# Patient Record
Sex: MALE | Race: WHITE | HISPANIC OR LATINO | Employment: OTHER | URBAN - METROPOLITAN AREA
[De-identification: names, ages, dates, MRNs, and addresses within clinical notes are randomized per-mention and may not be internally consistent; named-entity substitution may affect disease eponyms.]

---

## 2018-01-16 NOTE — RESULT NOTES
Verified Results  (1) COMPREHENSIVE METABOLIC PANEL [id number] 87:31AS [email or web address]     Test Name Result Flag Reference   GLUCOSE 102 mg/dL H 65-99   Fasting reference interval   UREA NITROGEN (BUN) 16 mg/dL  7-25   CREATININE 0 78 mg/dL  0 60-1 35   eGFR NON-AFR   AMERICAN 111 mL/min/1 73m2  > OR = 60   eGFR AFRICAN AMERICAN 129 mL/min/1 73m2  > OR = 60   BUN/CREATININE RATIO   2-02   NOT APPLICABLE (calc)   SODIUM 137 mmol/L  135-146   POTASSIUM 4 5 mmol/L  3 5-5 3   CHLORIDE 101 mmol/L     CARBON DIOXIDE 27 mmol/L  19-30   CALCIUM 9 6 mg/dL  8 6-10 3   PROTEIN, TOTAL 7 6 g/dL  6 1-8 1   ALBUMIN 4 3 g/dL  3 6-5 1   GLOBULIN 3 3 g/dL (calc)  1 9-3 7   ALBUMIN/GLOBULIN RATIO 1 3 (calc)  1 0-2 5   BILIRUBIN, TOTAL 0 4 mg/dL  0 2-1 2   ALKALINE PHOSPHATASE 58 U/L     AST 23 U/L  10-40   ALT 49 U/L H 9-46     (1) CBC/PLT/DIFF [id number] 08:10AM [email or web address]     Test Name Result Flag Reference   WHITE BLOOD CELL COUNT 7 6 Thousand/uL  3 8-10 8   RED BLOOD CELL COUNT 5 64 Million/uL  4 20-5 80   HEMOGLOBIN 15 1 g/dL  13 2-17 1   HEMATOCRIT 48 5 %  38 5-50 0   MCV 86 1 fL  80 0-100 0   MCH 26 9 pg L 27 0-33 0   MCHC 31 2 g/dL L 32 0-36 0   RDW 14 1 %  11 0-15 0   PLATELET COUNT 589 Thousand/uL  140-400   MPV 8 9 fL  7 5-11 5   ABSOLUTE NEUTROPHILS 4332 cells/uL  4529-2476   ABSOLUTE LYMPHOCYTES 2090 cells/uL  850-3900   ABSOLUTE MONOCYTES 844 cells/uL  200-950   ABSOLUTE EOSINOPHILS 289 cells/uL     ABSOLUTE BASOPHILS 46 cells/uL  0-200   NEUTROPHILS 57 0 %     LYMPHOCYTES 27 5 %     MONOCYTES 11 1 %     EOSINOPHILS 3 8 %     BASOPHILS 0 6 %       (1) LIPID PANEL, FASTING [id number] 08:10AM [email or web address]     Test Name Result Flag Reference   CHOLESTEROL, TOTAL 181 mg/dL  125-200   HDL CHOLESTEROL 47 mg/dL  > OR = 40   TRIGLICERIDES 63 mg/dL  <475   LDL-CHOLESTEROL 121 mg/dL (calc)  <130   Desirable range <100 mg/dL for patients with CHD or  diabetes and <70 mg/dL for diabetic patients with  known heart disease  CHOL/HDLC RATIO 3 9 (calc)  < OR = 5 0   NON HDL CHOLESTEROL 134 mg/dL (calc)     Target for non-HDL cholesterol is 30 mg/dL higher than   LDL cholesterol target  (Q) MICROALBUMIN, RANDOM URINE (W/CREATININE) [id number] 08:10AM [facility]     Test Name Result Flag Reference   CREATININE, RANDOM URINE 98 mg/dL     MICROALBUMIN 0 4 mg/dL     Reference Range  Not established   MICROALBUMIN/CREATININE$RATIO, RANDOM URINE 4 mcg/mg creat  <30   The ADA defines abnormalities in albumin  excretion as follows:     Category         Result (mcg/mg creatinine)     Normal                    <30  Microalbuminuria            Clinical albuminuria   > OR = 300     The ADA recommends that at least two of three  specimens collected within a 3-6 month period be  abnormal before considering a patient to be  within a diagnostic category  (Q) TSH, 3RD GENERATION [id number] 08:10AM [facility]     Test Name Result Flag Reference   TSH 1 10 mIU/L  0 40-4 50     (Q) HEMOGLOBIN A1c [id number] 08:10AM [facility]   REPORT COMMENT:  2ND LAB XQC#[id number]  FASTING:YES     Test Name Result Flag Reference   HEMOGLOBIN A1c 5 9 % of total Hgb H <5 7   According to ADA guidelines, hemoglobin A1c <7 0%  represents optimal control in non-pregnant diabetic  patients  Different metrics may apply to specific  patient populations  Standards of Medical Care in  [phone]  Diabetes Care  2013;36:s11-s66     For the purpose of screening for the presence of  diabetes  <5 7%       Consistent with the absence of diabetes  5 7-6 4%    Consistent with increased risk for diabetes              (prediabetes)  >or=6 5%    Consistent with diabetes     This assay result is consistent with an increased risk  of diabetes  Currently, no consensus exists for use of hemoglobin  A1c for diagnosis of diabetes for children  Discussion/Summary   sugars are surprisingly good  Cholesterol is well controled    normal blod count , kidneys, liver thyroid    Keep scheduled follow up

## 2018-03-22 ENCOUNTER — HOSPITAL ENCOUNTER (EMERGENCY)
Facility: HOSPITAL | Age: 43
Discharge: HOME | End: 2018-03-23
Attending: EMERGENCY MEDICINE
Payer: MEDICARE

## 2018-03-22 VITALS
WEIGHT: 289 LBS | HEART RATE: 78 BPM | TEMPERATURE: 99 F | DIASTOLIC BLOOD PRESSURE: 82 MMHG | OXYGEN SATURATION: 97 % | RESPIRATION RATE: 16 BRPM | SYSTOLIC BLOOD PRESSURE: 144 MMHG | BODY MASS INDEX: 42.68 KG/M2

## 2018-03-22 DIAGNOSIS — R68.89 FLU-LIKE SYMPTOMS: Primary | ICD-10-CM

## 2018-03-23 ENCOUNTER — APPOINTMENT (EMERGENCY)
Dept: RADIOLOGY | Facility: HOSPITAL | Age: 43
End: 2018-03-23
Payer: MEDICARE

## 2018-03-23 LAB — S PYO AG THROAT QL: NEGATIVE

## 2018-03-23 PROCEDURE — 71046 X-RAY EXAM CHEST 2 VIEWS: CPT

## 2018-03-23 PROCEDURE — 87070 CULTURE OTHR SPECIMN AEROBIC: CPT | Performed by: PHYSICIAN ASSISTANT

## 2018-03-23 PROCEDURE — 87430 STREP A AG IA: CPT | Performed by: PHYSICIAN ASSISTANT

## 2018-03-23 PROCEDURE — 99283 EMERGENCY DEPT VISIT LOW MDM: CPT

## 2018-03-23 NOTE — DISCHARGE INSTRUCTIONS
Influenza   WHAT YOU NEED TO KNOW:   Influenza (the flu) is an infection caused by the influenza virus  The flu is easily spread when an infected person coughs, sneezes, or has close contact with others  You may be able to spread the flu to others for 1 week or longer after signs or symptoms appear  DISCHARGE INSTRUCTIONS:   Call 911 for any of the following:   · You have trouble breathing, and your lips look purple or blue  · You have a seizure  Return to the emergency department if:   · You are dizzy, or you are urinating less or not at all  · You have a headache with a stiff neck, and you feel tired or confused  · You have new pain or pressure in your chest     · Your symptoms, such as shortness of breath, vomiting, or diarrhea, get worse  · Your symptoms, such as fever and coughing, seem to get better, but then get worse  Contact your healthcare provider if:   · You have new muscle pain or weakness  · You have questions or concerns about your condition or care  Medicines: You may need any of the following:  · Acetaminophen  decreases pain and fever  It is available without a doctor's order  Ask how much to take and how often to take it  Follow directions  Acetaminophen can cause liver damage if not taken correctly  · NSAIDs , such as ibuprofen, help decrease swelling, pain, and fever  This medicine is available with or without a doctor's order  NSAIDs can cause stomach bleeding or kidney problems in certain people  If you take blood thinner medicine, always ask your healthcare provider if NSAIDs are safe for you  Always read the medicine label and follow directions  · Antivirals  help fight a viral infection  · Take your medicine as directed  Contact your healthcare provider if you think your medicine is not helping or if you have side effects  Tell him or her if you are allergic to any medicine  Keep a list of the medicines, vitamins, and herbs you take   Include the amounts, and when and why you take them  Bring the list or the pill bottles to follow-up visits  Carry your medicine list with you in case of an emergency  Rest  as much as you can to help you recover  Drink liquids as directed  to help prevent dehydration  Ask how much liquid to drink each day and which liquids are best for you  Prevent the spread of influenza:   · Wash your hands often  Use soap and water  Wash your hands after you use the bathroom, change a child's diapers, or sneeze  Wash your hands before you prepare or eat food  Use gel hand cleanser when soap and water are not available  Do not touch your eyes, nose, or mouth unless you have washed your hands first            · Cover your mouth when you sneeze or cough  Cough into a tissue or the bend of your arm  · Clean shared items with a germ-killing   Clean table surfaces, doorknobs, and light switches  Do not share towels, silverware, and dishes with people who are sick  Wash bed sheets, towels, silverware, and dishes with soap and water  · Wear a mask  over your mouth and nose if you are sick or are near anyone who is sick  · Stay away from others  if you are sick  · Influenza vaccine  helps prevent influenza (flu)  Everyone older than 6 months should get a yearly influenza vaccine  Get the vaccine as soon as it is available, usually in September or October each year  Follow up with your healthcare provider as directed:  Write down your questions so you remember to ask them during your visits  © 2017 [address] Information is for End User's use only and may not be sold, redistributed or otherwise used for commercial purposes  All illustrations and images included in CareNotes® are the copyrighted property of [employer] , [employer]  or [employer]  The above information is an  only  It is not intended as medical advice for individual conditions or treatments   Talk to your doctor, nurse or pharmacist before following any medical regimen to see if it is safe and effective for you  DISCHARGE INSTRUCTIONS:    FOLLOW UP WITH YOUR PRIMARY CARE PROVIDER OR THE [address] [place] [address]  MAKE AN APPOINTMENT TO BE SEEN  TAKE TYLENOL OR MOTRIN FOR PAIN OR FEVER  REST AND DRINK PLENTY OF FLUIDS  IF SYMPTOMS WORSEN OR NEW SYMPTOMS ARISE, RETURN TO THE ER TO BE SEEN

## 2018-03-23 NOTE — ED PROVIDER NOTES
History  Chief Complaint   Patient presents with    Generalized Body Aches     pt c/o generalized body aches for the past 24 hrs  pt denies any fevers; pt denies any n/v/d      42y  o male with PMH of ADHD and asthma presents to the ER for sore throat, subjective fever and myalgias for 1 day  Patient has been using Listerine gargles for sore throat without relief  He describes his pain as irritated  He states his sore throat pain radiates to his left ear  Symptoms are constant  He denies sick contacts or recent travel  Associated symptoms: cough  He denies chills, rhinorrhea, congestion, chest pain, dyspnea, N/V/D, abdominal pain, weakness or paresthesias  History provided by:  Patient   used: No        None       Past Medical History:   Diagnosis Date    ADHD     Asthma        Past Surgical History:   Procedure Laterality Date    LEG AMPUTATION Left 2008    SHOULDER SURGERY Right 2008       History reviewed  No pertinent family history  I have reviewed and agree with the history as documented  Social History   Substance Use Topics    Smoking status: Never Smoker    Smokeless tobacco: Never Used    Alcohol use Yes      Comment: socially        Review of Systems   Constitutional: Positive for fever (subjective)  Negative for activity change, appetite change and chills  HENT: Positive for sore throat  Negative for congestion, drooling, ear discharge, ear pain, facial swelling and rhinorrhea  Eyes: Negative for redness  Respiratory: Positive for cough  Negative for shortness of breath  Cardiovascular: Negative for chest pain  Gastrointestinal: Negative for abdominal pain, diarrhea, nausea and vomiting  Musculoskeletal: Negative for neck stiffness  Skin: Negative for rash  Allergic/Immunologic: Negative for food allergies  Neurological: Negative for weakness and numbness         Physical Exam  ED Triage Vitals [03/22/18 2325]   Temperature Pulse Respirations Blood Pressure SpO2   99 °F (37 2 °C) 78 16 144/82 97 %      Temp Source Heart Rate Source Patient Position - Orthostatic VS BP Location FiO2 (%)   Oral Monitor Sitting Right arm --      Pain Score       6           Orthostatic Vital Signs  Vitals:    03/22/18 2325   BP: 144/82   Pulse: 78   Patient Position - Orthostatic VS: Sitting       Physical Exam   Constitutional:  Non-toxic appearance  No distress  HENT:   Head: Normocephalic and atraumatic  Right Ear: Tympanic membrane, external ear and ear canal normal  No drainage, swelling or tenderness  No foreign bodies  Tympanic membrane is not erythematous  No hemotympanum  Left Ear: Tympanic membrane, external ear and ear canal normal  No drainage, swelling or tenderness  No foreign bodies  Tympanic membrane is not erythematous  No hemotympanum  Nose: Nose normal    Mouth/Throat: Uvula is midline and mucous membranes are normal  No uvula swelling  Posterior oropharyngeal erythema present  No posterior oropharyngeal edema or tonsillar abscesses  No tonsillar exudate  Eyes: Conjunctivae and lids are normal    Neck: Normal range of motion and phonation normal  Neck supple  No tracheal deviation present  Cardiovascular: Normal rate, regular rhythm, S1 normal, S2 normal and normal heart sounds  Exam reveals no gallop and no friction rub  No murmur heard  Pulmonary/Chest: Effort normal and breath sounds normal  No respiratory distress  He has no decreased breath sounds  He has no wheezes  He has no rhonchi  He has no rales  He exhibits no tenderness  Neurological: He is alert  GCS eye subscore is 4  GCS verbal subscore is 5  GCS motor subscore is 6  Skin: Skin is warm and dry  No rash noted  Psychiatric: He has a normal mood and affect  Nursing note and vitals reviewed        ED Medications  Medications - No data to display    Diagnostic Studies  Results Reviewed     Procedure Component Value Units Date/Time    Rapid Beta strep screen [[id number]] (Normal) Collected:  03/23/18 0018    Lab Status:  Final result Specimen:  Throat from Throat Updated:  03/23/18 0035     Rapid Strep A Screen Negative    Throat culture [[id number]] Collected:  03/23/18 0018    Lab Status: In process Specimen:  Throat from Throat Updated:  03/23/18 0035                 XR chest 2 views   ED Interpretation by Max Lopez PA-C (03/23 5395)   No acute consolidation seen by me at this time  Procedures  Procedures       Phone Contacts  ED Phone Contact    ED Course  ED Course                                MDM  Number of Diagnoses or Management Options  Flu-like symptoms: new and requires workup  Diagnosis management comments: DDX consists of but not limited to: viral syndrome, strep, abscess, mono, flu, pneumonia, bronchitis    Will check strep and CXR  Informed patient I did not see any acute consolidation on CXR at this time and if the radiologist saw anything concerning when reading the xray, we would call to inform him  Patient agreeable  At discharge, I instructed the patient to:  -follow up with pcp  -take Tylenol or Motrin for pain or fever  -rest and drink plenty of fluids  -return to the ER if symptoms worsened or new symptoms arose  Patient agreed to this plan and was stable at time of discharge         Amount and/or Complexity of Data Reviewed  Clinical lab tests: ordered and reviewed  Tests in the radiology section of CPT®: ordered and reviewed  Independent visualization of images, tracings, or specimens: yes    Patient Progress  Patient progress: stable    CritCare Time    Disposition  Final diagnoses:   Flu-like symptoms     Time reflects when diagnosis was documented in both MDM as applicable and the Disposition within this note     Time User Action Codes Description Comment    3/23/2018 12:52 AM Jade ROBB Add [R61 89] Flu-like symptoms       ED Disposition     ED Disposition Condition Comment    Discharge  [facility] discharge to home/self care  Condition at discharge: Stable        Follow-up Information     Follow up With Specialties Details Why Natalia Araujo MD [facility] Schedule an appointment as soon as possible for a visit in 1 day  [address]  [phone]          Patient's Medications    No medications on file     No discharge procedures on file      ED Provider  Electronically Signed by           Syeda Walker PA-C  03/23/18 7662

## 2018-03-25 LAB — BACTERIA THROAT CULT: NORMAL

## 2018-11-01 ENCOUNTER — OFFICE VISIT (OUTPATIENT)
Dept: FAMILY MEDICINE CLINIC | Facility: CLINIC | Age: 43
End: 2018-11-01
Payer: MEDICARE

## 2018-11-01 VITALS
DIASTOLIC BLOOD PRESSURE: 84 MMHG | SYSTOLIC BLOOD PRESSURE: 132 MMHG | WEIGHT: 315 LBS | HEIGHT: 69 IN | BODY MASS INDEX: 46.65 KG/M2

## 2018-11-01 DIAGNOSIS — E11.9 TYPE 2 DIABETES MELLITUS WITHOUT COMPLICATION, WITHOUT LONG-TERM CURRENT USE OF INSULIN (HCC): ICD-10-CM

## 2018-11-01 DIAGNOSIS — J45.20 MILD INTERMITTENT ASTHMA WITHOUT COMPLICATION: ICD-10-CM

## 2018-11-01 DIAGNOSIS — Z23 NEED FOR VACCINATION: ICD-10-CM

## 2018-11-01 DIAGNOSIS — K76.0 NONALCOHOLIC FATTY LIVER DISEASE: ICD-10-CM

## 2018-11-01 DIAGNOSIS — E66.01 MORBID OBESITY WITH BMI OF 45.0-49.9, ADULT (HCC): ICD-10-CM

## 2018-11-01 DIAGNOSIS — H04.302 TEAR DUCT INFECTION, LEFT: Primary | ICD-10-CM

## 2018-11-01 DIAGNOSIS — E78.2 MIXED HYPERLIPIDEMIA: ICD-10-CM

## 2018-11-01 PROBLEM — Z89.612 HX OF AKA (ABOVE KNEE AMPUTATION), LEFT (HCC): Status: ACTIVE | Noted: 2018-11-01

## 2018-11-01 LAB
ALBUMIN SERPL BCP-MCNC: 4 G/DL (ref 3.5–5)
ALP SERPL-CCNC: 81 U/L (ref 46–116)
ALT SERPL W P-5'-P-CCNC: 92 U/L (ref 12–78)
ANION GAP SERPL CALCULATED.3IONS-SCNC: 7 MMOL/L (ref 4–13)
AST SERPL W P-5'-P-CCNC: 51 U/L (ref 5–45)
BACTERIA UR QL AUTO: ABNORMAL /HPF
BASOPHILS # BLD AUTO: 0.04 THOUSANDS/ΜL (ref 0–0.1)
BASOPHILS NFR BLD AUTO: 1 % (ref 0–1)
BILIRUB SERPL-MCNC: 0.44 MG/DL (ref 0.2–1)
BILIRUB UR QL STRIP: NEGATIVE
BUN SERPL-MCNC: 12 MG/DL (ref 5–25)
CALCIUM SERPL-MCNC: 9 MG/DL (ref 8.3–10.1)
CHLORIDE SERPL-SCNC: 103 MMOL/L (ref 100–108)
CHOLEST SERPL-MCNC: 203 MG/DL (ref 50–200)
CLARITY UR: CLEAR
CO2 SERPL-SCNC: 27 MMOL/L (ref 21–32)
COLOR UR: ABNORMAL
CREAT SERPL-MCNC: 0.75 MG/DL (ref 0.6–1.3)
EOSINOPHIL # BLD AUTO: 0.17 THOUSAND/ΜL (ref 0–0.61)
EOSINOPHIL NFR BLD AUTO: 3 % (ref 0–6)
ERYTHROCYTE [DISTWIDTH] IN BLOOD BY AUTOMATED COUNT: 13.7 % (ref 11.6–15.1)
EST. AVERAGE GLUCOSE BLD GHB EST-MCNC: 169 MG/DL
GFR SERPL CREATININE-BSD FRML MDRD: 111 ML/MIN/1.73SQ M
GLUCOSE P FAST SERPL-MCNC: 89 MG/DL (ref 65–99)
GLUCOSE UR STRIP-MCNC: NEGATIVE MG/DL
HBA1C MFR BLD: 7.5 % (ref 4.2–6.3)
HCT VFR BLD AUTO: 48.8 % (ref 36.5–49.3)
HDLC SERPL-MCNC: 49 MG/DL (ref 40–60)
HGB BLD-MCNC: 15.4 G/DL (ref 12–17)
HGB UR QL STRIP.AUTO: NEGATIVE
HYALINE CASTS #/AREA URNS LPF: ABNORMAL /LPF
IMM GRANULOCYTES # BLD AUTO: 0.02 THOUSAND/UL (ref 0–0.2)
IMM GRANULOCYTES NFR BLD AUTO: 0 % (ref 0–2)
KETONES UR STRIP-MCNC: NEGATIVE MG/DL
LDLC SERPL CALC-MCNC: 136 MG/DL (ref 0–100)
LEUKOCYTE ESTERASE UR QL STRIP: NEGATIVE
LYMPHOCYTES # BLD AUTO: 2.17 THOUSANDS/ΜL (ref 0.6–4.47)
LYMPHOCYTES NFR BLD AUTO: 41 % (ref 14–44)
MCH RBC QN AUTO: 27.3 PG (ref 26.8–34.3)
MCHC RBC AUTO-ENTMCNC: 31.6 G/DL (ref 31.4–37.4)
MCV RBC AUTO: 86 FL (ref 82–98)
MONOCYTES # BLD AUTO: 0.65 THOUSAND/ΜL (ref 0.17–1.22)
MONOCYTES NFR BLD AUTO: 12 % (ref 4–12)
NEUTROPHILS # BLD AUTO: 2.26 THOUSANDS/ΜL (ref 1.85–7.62)
NEUTS SEG NFR BLD AUTO: 43 % (ref 43–75)
NITRITE UR QL STRIP: NEGATIVE
NON-SQ EPI CELLS URNS QL MICRO: ABNORMAL /HPF
NONHDLC SERPL-MCNC: 154 MG/DL
NRBC BLD AUTO-RTO: 0 /100 WBCS
PH UR STRIP.AUTO: 5.5 [PH] (ref 4.5–8)
PLATELET # BLD AUTO: 237 THOUSANDS/UL (ref 149–390)
PMV BLD AUTO: 11 FL (ref 8.9–12.7)
POTASSIUM SERPL-SCNC: 4.8 MMOL/L (ref 3.5–5.3)
PROT SERPL-MCNC: 7.9 G/DL (ref 6.4–8.2)
PROT UR STRIP-MCNC: ABNORMAL MG/DL
RBC # BLD AUTO: 5.65 MILLION/UL (ref 3.88–5.62)
RBC #/AREA URNS AUTO: ABNORMAL /HPF
SODIUM SERPL-SCNC: 137 MMOL/L (ref 136–145)
SP GR UR STRIP.AUTO: 1.03 (ref 1–1.03)
TRIGL SERPL-MCNC: 90 MG/DL
TSH SERPL DL<=0.05 MIU/L-ACNC: 0.96 UIU/ML (ref 0.36–3.74)
UROBILINOGEN UR QL STRIP.AUTO: 0.2 E.U./DL
WBC # BLD AUTO: 5.31 THOUSAND/UL (ref 4.31–10.16)
WBC #/AREA URNS AUTO: ABNORMAL /HPF

## 2018-11-01 PROCEDURE — 83036 HEMOGLOBIN GLYCOSYLATED A1C: CPT | Performed by: FAMILY MEDICINE

## 2018-11-01 PROCEDURE — 36415 COLL VENOUS BLD VENIPUNCTURE: CPT | Performed by: FAMILY MEDICINE

## 2018-11-01 PROCEDURE — 80053 COMPREHEN METABOLIC PANEL: CPT | Performed by: FAMILY MEDICINE

## 2018-11-01 PROCEDURE — 84443 ASSAY THYROID STIM HORMONE: CPT | Performed by: FAMILY MEDICINE

## 2018-11-01 PROCEDURE — 81001 URINALYSIS AUTO W/SCOPE: CPT | Performed by: FAMILY MEDICINE

## 2018-11-01 PROCEDURE — 85025 COMPLETE CBC W/AUTO DIFF WBC: CPT | Performed by: FAMILY MEDICINE

## 2018-11-01 PROCEDURE — 99214 OFFICE O/P EST MOD 30 MIN: CPT | Performed by: FAMILY MEDICINE

## 2018-11-01 PROCEDURE — 80061 LIPID PANEL: CPT | Performed by: FAMILY MEDICINE

## 2018-11-01 PROCEDURE — 90682 RIV4 VACC RECOMBINANT DNA IM: CPT

## 2018-11-01 PROCEDURE — 90471 IMMUNIZATION ADMIN: CPT

## 2018-11-01 RX ORDER — OFLOXACIN 3 MG/ML
2 SOLUTION/ DROPS OPHTHALMIC 4 TIMES DAILY
Qty: 5 ML | Refills: 0 | Status: SHIPPED | OUTPATIENT
Start: 2018-11-01 | End: 2018-12-21

## 2018-11-01 NOTE — ASSESSMENT & PLAN NOTE
Had been diet controlled in the past   With his significant weight gain   I am not so sure his numbers will be as good  Will get lab today, staff will contact him once we get the results    Diet and exercise discussed at length

## 2018-11-01 NOTE — ASSESSMENT & PLAN NOTE
Diet and exercise discussed at length  Needs to do 30 minutes of cardio 5 days a week, needs to get his diet in line with his other medical conditions  Patient promises he will do so, knows what to do

## 2018-11-01 NOTE — PROGRESS NOTES
Assessment/Plan:    Type 2 diabetes mellitus without complication, without long-term current use of insulin ([facility] [address] )  Has been diet controlled in the past   Lab will be drawn the office today, staff will contact him once we get the results  Mild intermittent asthma without complication  Has not used an inhaler in years  Will get a flu shot today  Mixed hyperlipidemia  Had been diet controlled in the past   With his significant weight gain   I am not so sure his numbers will be as good  Will get lab today, staff will contact him once we get the results  Diet and exercise discussed at length    Nonalcoholic fatty liver disease  Lab drawn in the office today  Staff will contact him once we have the results  Morbid obesity with BMI of 45 0-49 9, adult ([facility])  Diet and exercise discussed at length  Needs to do 30 minutes of cardio 5 days a week, needs to get his diet in line with his other medical conditions  Patient promises he will do so, knows what to do      Hx of AKA (above knee amputation), left ([facility] [address] )  Needs to follow up with the prostatic specialist        Diagnoses and all orders for this visit:    Tear duct infection, left  -     ofloxacin (OCUFLOX) 0 3 % ophthalmic solution; Administer 2 drops into the left eye 4 (four) times a day    Type 2 diabetes mellitus without complication, without long-term current use of insulin ([facility])  -     CBC and differential  -     Comprehensive metabolic panel  -     Hemoglobin A1C  -     Lipid panel  -     TSH, 3rd generation  -     Urinalysis with reflex to microscopic    Nonalcoholic fatty liver disease  -     CBC and differential  -     Comprehensive metabolic panel    Mixed hyperlipidemia  -     Lipid panel  -     TSH, 3rd generation    Mild intermittent asthma without complication    Need for vaccination  -     influenza vaccine, [id number], quadrivalent, recombinant, PF, 0 5 mL, for patients 18-49 yr with comorbidities ([facility])    Morbid obesity with BMI of 45 0-49 9, adult [facility])          Subjective:   Chief Complaint   Patient presents with    Eye Drainage     left x 1 month          Patient ID: Aayush Oneill is a 37 y o  male  Patient is a 67-year-old male presenting to the office complaining of left eye drainage intermittently for the last several months  States he will wake up in the morning and is eyelids will be glued shut  During the day, he notices drainage from the inner aspect of his left eye  He has been over 2 years since his last checkup, he is way overdue for lab work and a physical exam   Fortunately, he is doing well  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 [employer]  He is taking classes to his master's degree in some type of  system  The following portions of the patient's history were reviewed and updated as appropriate: allergies, current medications, past family history, past medical history, past social history, past surgical history and problem list     Review of Systems   Constitutional: Positive for unexpected weight change (60 lb over the last 2 years)  Negative for appetite change, chills, diaphoresis, fatigue and fever  HENT: Negative for congestion, ear pain, hearing loss, nosebleeds, postnasal drip, rhinorrhea, sinus pressure, sore throat, tinnitus and trouble swallowing  Eyes: Positive for discharge  Negative for photophobia, pain, redness, itching and visual disturbance  Respiratory: Negative for cough, chest tightness, shortness of breath and wheezing  Cardiovascular: Negative for chest pain, palpitations and leg swelling  Denies orthopnea , dyspnea on exertion   Gastrointestinal: Negative for abdominal distention, abdominal pain, blood in stool, constipation, diarrhea, nausea and vomiting  Endocrine: Negative  Genitourinary: Negative for difficulty urinating, dysuria, flank pain, frequency, hematuria and urgency          Denies nocturia , erectile dysfunction   Musculoskeletal: Negative for arthralgias, back pain, gait problem, joint swelling and myalgias  Skin: Negative for pallor, rash and wound  Denies skin lesions   Allergic/Immunologic: Negative for environmental allergies, food allergies and immunocompromised state  Neurological: Negative for dizziness, tremors, seizures, syncope, speech difficulty, weakness, numbness and headaches  Hematological: Negative for adenopathy  Does not bruise/bleed easily  Psychiatric/Behavioral: Negative for behavioral problems, confusion, sleep disturbance and suicidal ideas  The patient is not nervous/anxious  Objective:      /84   Ht 5' 9" (1 753 m)   Wt (!) 149 kg (328 lb)   BMI 48 44 kg/m²          Physical Exam   Constitutional: He is oriented to person, place, and time  He appears well-developed and well-nourished  Pleasant, morbidly obese  HENT:   Head: Normocephalic and atraumatic  Nose: Nose normal    Mouth/Throat: Oropharynx is clear and moist    Eyes: Pupils are equal, round, and reactive to light  Conjunctivae and EOM are normal  Left eye exhibits discharge  Neck: Normal range of motion  Neck supple  No JVD present  No tracheal deviation present  No thyromegaly present  Cardiovascular: Normal rate, regular rhythm and intact distal pulses  No murmur heard  Pulmonary/Chest: Effort normal and breath sounds normal  He has no wheezes  He has no rales  Abdominal: Soft  Bowel sounds are normal  He exhibits no mass  There is no tenderness  There is no rebound and no guarding  Musculoskeletal: He exhibits no edema, tenderness or deformity  Left lower extremity prosthesis  Lymphadenopathy:     He has no cervical adenopathy  Neurological: He is alert and oriented to person, place, and time  He has normal reflexes  No cranial nerve deficit  He exhibits normal muscle tone  Coordination normal    Skin: Skin is warm and dry  No lesion and no rash noted   Nails show no clubbing  Psychiatric: He has a normal mood and affect  Judgment normal    He has endure a lot of stress  His son is a member of again, has committed multiple crimes  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

## 2018-11-01 NOTE — ASSESSMENT & PLAN NOTE
Has been diet controlled in the past   Lab will be drawn the office today, staff will contact him once we get the results

## 2018-11-14 ENCOUNTER — OFFICE VISIT (OUTPATIENT)
Dept: FAMILY MEDICINE CLINIC | Facility: CLINIC | Age: 43
End: 2018-11-14
Payer: MEDICARE

## 2018-11-14 VITALS
SYSTOLIC BLOOD PRESSURE: 124 MMHG | WEIGHT: 315 LBS | BODY MASS INDEX: 46.65 KG/M2 | DIASTOLIC BLOOD PRESSURE: 82 MMHG | HEIGHT: 69 IN

## 2018-11-14 DIAGNOSIS — E11.9 TYPE 2 DIABETES MELLITUS WITHOUT COMPLICATION, WITHOUT LONG-TERM CURRENT USE OF INSULIN (HCC): Primary | ICD-10-CM

## 2018-11-14 DIAGNOSIS — J45.20 MILD INTERMITTENT ASTHMA WITHOUT COMPLICATION: ICD-10-CM

## 2018-11-14 DIAGNOSIS — K76.0 NONALCOHOLIC FATTY LIVER DISEASE: ICD-10-CM

## 2018-11-14 PROCEDURE — 99214 OFFICE O/P EST MOD 30 MIN: CPT | Performed by: FAMILY MEDICINE

## 2018-11-14 RX ORDER — LANCETS 28 GAUGE
EACH MISCELLANEOUS
Qty: 100 EACH | Refills: 2 | Status: SHIPPED | OUTPATIENT
Start: 2018-11-14

## 2018-11-14 RX ORDER — ALBUTEROL SULFATE 90 UG/1
2 AEROSOL, METERED RESPIRATORY (INHALATION) EVERY 6 HOURS PRN
Qty: 1 INHALER | Refills: 5 | Status: SHIPPED | OUTPATIENT
Start: 2018-11-14 | End: 2018-12-16

## 2018-11-14 NOTE — PROGRESS NOTES
Assessment/Plan:    Type 2 diabetes mellitus without complication, without long-term current use of insulin ([facility])   HB A1c is 7 5  Patient was given a glucometer, he will check his sugars on a daily basis fasting and 2 hr postprandial, but only once a day  He is to alternate times  He will call though sugars into the office in 2 weeks  If his sugars are consistently 1 less than 160 will not have to start medication  Goal is for all sugars to be less than 160, fastings to be closer to 120  Regardless, we will recheck an HB A1c in 3 months  He has a 2000 K calorie ADA diet and will follow that  I spent more than 25 min with the patient today and more than half of that time was spent counseling and coordinating care  Diagnoses and all orders for this visit:    Type 2 diabetes mellitus without complication, without long-term current use of insulin ([facility])  -     Glucometer test strips  -     Lancets (FREESTYLE) lancets; Use as instructed    Nonalcoholic fatty liver disease    Mild intermittent asthma without complication  -     albuterol (VENTOLIN HFA) 90 mcg/act inhaler; Inhale 2 puffs every 6 (six) hours as needed for wheezing          Subjective:   Chief Complaint   Patient presents with    Follow-up     discuss blood work          Patient ID: Tyesha Jose is a 37 y o  male  Patient is a 57-year-old male presenting to the office for follow-up on his recent lab work  His HB A1c is elevated at 7 5 so technically he is now diabetic  He did have trace protein in his urine  Liver enzymes are mildly elevated, consistent with his history of fatty liver disease  He has already started to diet, he has lost 2 lb since his last visit  He has an eye exam next week          The following portions of the patient's history were reviewed and updated as appropriate: allergies, current medications, past family history, past medical history, past social history, past surgical history and problem list     Review of Systems   Constitutional: Negative for activity change, appetite change, fatigue and unexpected weight change  HENT: Negative  Eyes: Negative  Respiratory: Positive for chest tightness and wheezing  Had a flare of his asthma last week, had to stop at his sister's house to borrow her inhaler  Requesting a refill on his  Has not needed to use an inhaler in over a year and half  Cardiovascular: Negative  Gastrointestinal: Negative  Endocrine: Positive for polydipsia, polyphagia and polyuria  Negative for cold intolerance and heat intolerance  Genitourinary: Positive for frequency  Nocturia x2   Musculoskeletal: Negative  Skin: Negative  Allergic/Immunologic: Negative for immunocompromised state  Neurological: Negative for dizziness, weakness and light-headedness  Hematological: Negative  Psychiatric/Behavioral: Negative  Objective:      /82   Ht 5' 9" (1 753 m)   Wt (!) 148 kg (326 lb)   BMI 48 14 kg/m²          Physical Exam   Constitutional: He is oriented to person, place, and time  He appears well-developed and well-nourished  HENT:   Head: Normocephalic and atraumatic  Nose: Nose normal    Mouth/Throat: Oropharynx is clear and moist    Eyes: Pupils are equal, round, and reactive to light  Conjunctivae and EOM are normal    Neck: Normal range of motion  Neck supple  No thyromegaly present  Cardiovascular: Normal rate, regular rhythm and intact distal pulses  No murmur heard  Pulmonary/Chest: Effort normal and breath sounds normal  He has no wheezes  He has no rales  Abdominal: Soft  Bowel sounds are normal  There is no tenderness  Musculoskeletal: He exhibits no edema  Status post left AKA   Neurological: He is alert and oriented to person, place, and time  He has normal reflexes  He exhibits normal muscle tone  Coordination normal    Skin: Skin is warm and dry  No lesion and no rash noted  Nails show no clubbing  Psychiatric: He has a normal mood and affect  Judgment normal    Nursing note and vitals reviewed

## 2018-11-14 NOTE — ASSESSMENT & PLAN NOTE
HB A1c is 7 5  Patient was given a glucometer, he will check his sugars on a daily basis fasting and 2 hr postprandial, but only once a day  He is to alternate times  He will call though sugars into the office in 2 weeks  If his sugars are consistently 1 less than 160 will not have to start medication  Goal is for all sugars to be less than 160, fastings to be closer to 120  Regardless, we will recheck an HB A1c in 3 months  He has a 2000 K calorie ADA diet and will follow that  I spent more than 25 min with the patient today and more than half of that time was spent counseling and coordinating care

## 2018-11-26 ENCOUNTER — TELEPHONE (OUTPATIENT)
Dept: FAMILY MEDICINE CLINIC | Facility: CLINIC | Age: 43
End: 2018-11-26

## 2018-12-03 ENCOUNTER — TELEPHONE (OUTPATIENT)
Dept: FAMILY MEDICINE CLINIC | Facility: CLINIC | Age: 43
End: 2018-12-03

## 2018-12-03 NOTE — TELEPHONE ENCOUNTER
Continue current medications for now  Continue checking sugars at least once a day  Recheck Hb A1c 3 months from last visit

## 2018-12-16 ENCOUNTER — OFFICE VISIT (OUTPATIENT)
Dept: URGENT CARE | Facility: MEDICAL CENTER | Age: 43
End: 2018-12-16
Payer: MEDICARE

## 2018-12-16 VITALS
TEMPERATURE: 97.7 F | HEART RATE: 66 BPM | BODY MASS INDEX: 45.18 KG/M2 | HEIGHT: 69 IN | RESPIRATION RATE: 20 BRPM | DIASTOLIC BLOOD PRESSURE: 84 MMHG | SYSTOLIC BLOOD PRESSURE: 136 MMHG | WEIGHT: 305 LBS | OXYGEN SATURATION: 96 %

## 2018-12-16 DIAGNOSIS — J02.9 SORE THROAT: ICD-10-CM

## 2018-12-16 DIAGNOSIS — J45.901 EXACERBATION OF ASTHMA, UNSPECIFIED ASTHMA SEVERITY, UNSPECIFIED WHETHER PERSISTENT: Primary | ICD-10-CM

## 2018-12-16 LAB — S PYO AG THROAT QL: NEGATIVE

## 2018-12-16 PROCEDURE — 99213 OFFICE O/P EST LOW 20 MIN: CPT | Performed by: FAMILY MEDICINE

## 2018-12-16 PROCEDURE — G0463 HOSPITAL OUTPT CLINIC VISIT: HCPCS | Performed by: FAMILY MEDICINE

## 2018-12-16 RX ORDER — BENZONATATE 100 MG/1
100 CAPSULE ORAL 3 TIMES DAILY PRN
Qty: 20 CAPSULE | Refills: 0 | Status: SHIPPED | OUTPATIENT
Start: 2018-12-16

## 2018-12-16 RX ORDER — ALBUTEROL SULFATE 90 UG/1
2 AEROSOL, METERED RESPIRATORY (INHALATION) EVERY 6 HOURS PRN
Qty: 1 INHALER | Refills: 0 | Status: SHIPPED | OUTPATIENT
Start: 2018-12-16

## 2018-12-16 NOTE — PATIENT INSTRUCTIONS
I prescribed Ventolin inhaler, Tessalon Perles 100 mg q 8 hours  Recommended patient obtain Mucinex for expectoration  Also recommended Flonase nasal spray for nasal congestion  Follow-up PCP symptoms worsen  Asthma   WHAT YOU NEED TO KNOW:   Asthma is a lung disease that makes breathing difficult  Chronic inflammation and reactions to triggers narrow the airways in the lungs  Asthma can become life-threatening if it is not managed  DISCHARGE INSTRUCTIONS:   Return to the emergency department if:   · You have severe shortness of breath  · Your lips or nails turn blue or gray  · The skin around your neck and ribs pulls in with each breath  · You have shortness of breath, even after you take your short-term medicine as directed  · Your peak flow numbers are in the red zone of your AAP  Contact your healthcare provider if:   · You run out of medicine before your next refill is due  · Your symptoms get worse  · You need to take more medicine than usual to control your symptoms  · You have questions or concerns about your condition or care  Medicines:   · Medicines  decrease inflammation, open airways, and make it easier to breathe  Medicines may be inhaled, taken as a pill, or injected  Short-term medicines relieve your symptoms quickly  Long-term medicines are used to prevent future attacks  You may also need medicine to help control your allergies  Ask your healthcare provider for more information about the medicine you are given and how to take it safely  · Take your medicine as directed  Contact your healthcare provider if you think your medicine is not helping or if you have side effects  Tell him of her if you are allergic to any medicine  Keep a list of the medicines, vitamins, and herbs you take  Include the amounts, and when and why you take them  Bring the list or the pill bottles to follow-up visits  Carry your medicine list with you in case of an emergency    Follow up with your healthcare provider as directed: You will need to return to make sure your medicine is working and your symptoms are controlled  You may be referred to an asthma specialist  Alyssa Tommyrodrick may be asked to keep a record of your peak flow values and bring it with you to your appointments  Write down your questions so you remember to ask them during your visits  Manage your symptoms and prevent future attacks:   · Follow your Asthma Action Plan (AAP)  This is a written plan that you and your healthcare provider create  It explains which medicine you need and when to change doses if necessary  It also explains how you can monitor symptoms and use a peak flow meter  The meter measures how well your lungs are working  · Manage other health conditions , such as allergies, acid reflux, and sleep apnea  · Identify and avoid triggers  These may include pets, dust mites, mold, and cockroaches  · Do not smoke or be around others who smoke  Nicotine and other chemicals in cigarettes and cigars can cause lung damage  Ask your healthcare provider for information if you currently smoke and need help to quit  E-cigarettes or smokeless tobacco still contain nicotine  Talk to your healthcare provider before you use these products  · Ask about the flu vaccine  The flu can make your asthma worse  You may need a yearly flu shot  © 2017 [employer] Information is for End User's use only and may not be sold, redistributed or otherwise used for commercial purposes  All illustrations and images included in CareNotes® are the copyrighted property of [employer]  or [employer]  The above information is an  only  It is not intended as medical advice for individual conditions or treatments  Talk to your doctor, nurse or pharmacist before following any medical regimen to see if it is safe and effective for you

## 2018-12-16 NOTE — PROGRESS NOTES
[facility]        NAME: Toney Marques is a 37 y o  male  : 1975    MRN: [id number]  DATE: 2018  TIME: 10:14 AM    Assessment and Plan   Exacerbation of asthma, unspecified asthma severity, unspecified whether persistent [J45 901]  1  Exacerbation of asthma, unspecified asthma severity, unspecified whether persistent  albuterol (PROVENTIL HFA,VENTOLIN HFA) 90 mcg/act inhaler    benzonatate (TESSALON PERLES) 100 mg capsule   2  Sore throat  POCT rapid strepA         Patient Instructions       Follow up with PCP in 3-5 days  Proceed to  ER if symptoms worsen  Chief Complaint     Chief Complaint   Patient presents with    Sore Throat     Patient complains of sor throat, cough and congestion since last Thursday         History of Present Illness       Patient complaining of sore throat for last 4 days  It began as scratchy in nature now burning  He has used some Sucrets with mild improvement  Also describes 2 day history of cough accompanied by postnasal drip  Currently taking no cough medication  His cough is predominant nonproductive it is accompanied by shortness of breath  Does describe a history of asthma but has not used her rescue inhaler since he ran out of at while ago  Review of Systems   Review of Systems   HENT: Positive for congestion and sore throat  Respiratory: Positive for cough and shortness of breath            Current Medications       Current Outpatient Prescriptions:     albuterol (PROVENTIL HFA,VENTOLIN HFA) 90 mcg/act inhaler, Inhale 2 puffs every 6 (six) hours as needed for wheezing, Disp: 1 Inhaler, Rfl: 0    benzonatate (TESSALON PERLES) 100 mg capsule, Take 1 capsule (100 mg total) by mouth 3 (three) times a day as needed for cough, Disp: 20 capsule, Rfl: 0    Lancets (FREESTYLE) lancets, Use as instructed, Disp: 100 each, Rfl: 2    ofloxacin (OCUFLOX) 0 3 % ophthalmic solution, Administer 2 drops into the left eye 4 (four) times a day (Patient not taking: Reported on 12/16/2018 ), Disp: 5 mL, Rfl: 0    Current Allergies     Allergies as of 12/16/2018 - Reviewed 12/16/2018   Allergen Reaction Noted    Lorazepam Syncope 03/22/2018            The following portions of the patient's history were reviewed and updated as appropriate: allergies, current medications, past family history, past medical history, past social history, past surgical history and problem list      Past Medical History:   Diagnosis Date    ADHD     Asthma     Depression     Last assessed 2/22/2013     Pneumonia     Community acquired  Resolved 5/27/2014     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 ([address] )     Resolved 6/2/2014        Past Surgical History:   Procedure Laterality Date    COLONOSCOPY      LEG AMPUTATION Left 2008    SHOULDER SURGERY Right 2008       Family History   Problem Relation Age of Onset    Anemia Mother     Diabetes Father     Colon cancer Father     Multiple sclerosis Sister     Alzheimer's disease Maternal Grandmother     Diabetes Maternal Grandmother     Alzheimer's disease Maternal Grandfather     Diabetes Paternal Grandmother     Throat cancer Paternal Grandmother     Diabetes Paternal Grandfather          Medications have been verified  Objective   /84 (BP Location: Left arm, Patient Position: Sitting)   Pulse 66   Temp 97 7 °F (36 5 °C) (Tympanic)   Resp 20   Ht 5' 9" (1 753 m)   Wt (!) 138 kg (305 lb)   SpO2 96%   BMI 45 04 kg/m²        Physical Exam     Physical Exam   HENT:   Right Ear: External ear normal    Left Ear: External ear normal    Mouth/Throat: Oropharynx is clear and moist    Neck: Normal range of motion  Neck supple  Pulmonary/Chest: Effort normal  He has wheezes  Nursing note and vitals reviewed

## 2018-12-18 ENCOUNTER — OFFICE VISIT (OUTPATIENT)
Dept: URGENT CARE | Facility: MEDICAL CENTER | Age: 43
End: 2018-12-18
Payer: MEDICARE

## 2018-12-18 VITALS
RESPIRATION RATE: 20 BRPM | WEIGHT: 304 LBS | BODY MASS INDEX: 45.03 KG/M2 | HEIGHT: 69 IN | TEMPERATURE: 100 F | HEART RATE: 77 BPM | SYSTOLIC BLOOD PRESSURE: 125 MMHG | DIASTOLIC BLOOD PRESSURE: 60 MMHG | OXYGEN SATURATION: 95 %

## 2018-12-18 DIAGNOSIS — J45.901 ASTHMA WITH ACUTE EXACERBATION, UNSPECIFIED ASTHMA SEVERITY, UNSPECIFIED WHETHER PERSISTENT: ICD-10-CM

## 2018-12-18 DIAGNOSIS — J20.8 ACUTE BACTERIAL BRONCHITIS: Primary | ICD-10-CM

## 2018-12-18 DIAGNOSIS — B96.89 ACUTE BACTERIAL BRONCHITIS: Primary | ICD-10-CM

## 2018-12-18 PROCEDURE — 99214 OFFICE O/P EST MOD 30 MIN: CPT | Performed by: FAMILY MEDICINE

## 2018-12-18 PROCEDURE — G0463 HOSPITAL OUTPT CLINIC VISIT: HCPCS | Performed by: FAMILY MEDICINE

## 2018-12-18 RX ORDER — AZITHROMYCIN 250 MG/1
TABLET, FILM COATED ORAL
Qty: 6 TABLET | Refills: 0 | Status: SHIPPED | OUTPATIENT
Start: 2018-12-18 | End: 2018-12-22

## 2018-12-18 RX ORDER — PREDNISONE 10 MG/1
10 TABLET ORAL DAILY
Qty: 32 TABLET | Refills: 0 | Status: SHIPPED | OUTPATIENT
Start: 2018-12-18

## 2018-12-19 NOTE — PROGRESS NOTES
[facility]        NAME: Puja oMsley is a 37 y o  male  : 1975    MRN: [id number]  DATE: 2018  TIME: 10:34 PM    Assessment and Plan   Acute bacterial bronchitis [J20 8, B96 89]  1  Acute bacterial bronchitis  azithromycin (ZITHROMAX) 250 mg tablet    predniSONE 10 mg tablet   2  Asthma with acute exacerbation, unspecified asthma severity, unspecified whether persistent         Patient Instructions       Follow up with PCP in 3-5 days  Proceed to  ER if symptoms worsen  Chief Complaint     Chief Complaint   Patient presents with    Cough     Cough x 5 days  Evaluated here by Dr Blossom Kayser  Tx mucinex, thaisslocamelia perrles, and inhaler  Pt's condition is worsening and now he has fever of 102 0  History of Present Illness       76-year-old male with history of asthma comes in complaining of cough and wheezing for the past week  He has been taking over-the-counter medicine without much relief  Cough and shortness of breath or increasing  He also has fevers now  He denies any nausea vomiting or diarrhea          Review of Systems   Review of Systems  As above    Current Medications       Current Outpatient Prescriptions:     albuterol (PROVENTIL HFA,VENTOLIN HFA) 90 mcg/act inhaler, Inhale 2 puffs every 6 (six) hours as needed for wheezing, Disp: 1 Inhaler, Rfl: 0    azithromycin (ZITHROMAX) 250 mg tablet, Take 2 tablets today then 1 tablet daily x 4 days, Disp: 6 tablet, Rfl: 0    benzonatate (TESSALON PERLES) 100 mg capsule, Take 1 capsule (100 mg total) by mouth 3 (three) times a day as needed for cough, Disp: 20 capsule, Rfl: 0    Lancets (FREESTYLE) lancets, Use as instructed, Disp: 100 each, Rfl: 2    ofloxacin (OCUFLOX) 0 3 % ophthalmic solution, Administer 2 drops into the left eye 4 (four) times a day (Patient not taking: Reported on 2018 ), Disp: 5 mL, Rfl: 0    predniSONE 10 mg tablet, Take 1 tablet (10 mg total) by mouth daily, Disp: 32 tablet, Rfl: 0    Current Allergies     Allergies as of 12/18/2018 - Reviewed 12/18/2018   Allergen Reaction Noted    Lorazepam Syncope 03/22/2018            The following portions of the patient's history were reviewed and updated as appropriate: allergies, current medications, past family history, past medical history, past social history, past surgical history and problem list      Past Medical History:   Diagnosis Date    ADHD     Asthma     Depression     Last assessed 2/22/2013     Pneumonia     Community acquired  Resolved 5/27/2014     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 ([address] )     Resolved 6/2/2014        Past Surgical History:   Procedure Laterality Date    COLONOSCOPY      LEG AMPUTATION Left 2008    SHOULDER SURGERY Right 2008       Family History   Problem Relation Age of Onset    Anemia Mother     Diabetes Father     Colon cancer Father     Multiple sclerosis Sister     Alzheimer's disease Maternal Grandmother     Diabetes Maternal Grandmother     Alzheimer's disease Maternal Grandfather     Diabetes Paternal Grandmother     Throat cancer Paternal Grandmother     Diabetes Paternal Grandfather          Medications have been verified  Objective   /60   Pulse 77   Temp 100 °F (37 8 °C)   Resp 20   Ht 5' 9" (1 753 m)   Wt (!) 138 kg (304 lb)   SpO2 95%   BMI 44 89 kg/m²        Physical Exam     Physical Exam   Constitutional: He is oriented to person, place, and time  He appears well-developed and well-nourished  HENT:   Head: Normocephalic and atraumatic  Mouth/Throat: Oropharynx is clear and moist    Eyes: Conjunctivae are normal    Neck: Neck supple  Cardiovascular: Normal rate, regular rhythm and normal heart sounds  Pulmonary/Chest: Effort normal  No respiratory distress  He has wheezes (Wheezing in all fields)  Abdominal: Soft  Bowel sounds are normal  He exhibits no distension  Musculoskeletal: Normal range of motion     Neurological: He is alert and oriented to person, place, and time  Skin: Skin is warm and dry  No rash noted  Psychiatric: He has a normal mood and affect   His behavior is normal  Judgment and thought content normal

## 2018-12-21 ENCOUNTER — TELEPHONE (OUTPATIENT)
Dept: FAMILY MEDICINE CLINIC | Facility: CLINIC | Age: 43
End: 2018-12-21

## 2018-12-21 DIAGNOSIS — B96.89 ACUTE BACTERIAL BRONCHITIS: Primary | ICD-10-CM

## 2018-12-21 DIAGNOSIS — J20.8 ACUTE BACTERIAL BRONCHITIS: Primary | ICD-10-CM

## 2018-12-21 RX ORDER — PROMETHAZINE HYDROCHLORIDE AND CODEINE PHOSPHATE 6.25; 1 MG/5ML; MG/5ML
5 SYRUP ORAL EVERY 6 HOURS PRN
Qty: 180 ML | Refills: 0 | Status: SHIPPED | OUTPATIENT
Start: 2018-12-21